# Patient Record
Sex: FEMALE | Race: WHITE | NOT HISPANIC OR LATINO | Employment: UNEMPLOYED | URBAN - METROPOLITAN AREA
[De-identification: names, ages, dates, MRNs, and addresses within clinical notes are randomized per-mention and may not be internally consistent; named-entity substitution may affect disease eponyms.]

---

## 2022-04-19 ENCOUNTER — OFFICE VISIT (OUTPATIENT)
Dept: OBSTETRICS AND GYNECOLOGY | Facility: CLINIC | Age: 37
End: 2022-04-19

## 2022-04-19 VITALS
DIASTOLIC BLOOD PRESSURE: 70 MMHG | BODY MASS INDEX: 33.9 KG/M2 | SYSTOLIC BLOOD PRESSURE: 130 MMHG | WEIGHT: 198.6 LBS | HEIGHT: 64 IN

## 2022-04-19 DIAGNOSIS — N99.85 POST ENDOMETRIAL ABLATION SYNDROME: ICD-10-CM

## 2022-04-19 DIAGNOSIS — N93.9 ABNORMAL UTERINE BLEEDING (AUB): Primary | ICD-10-CM

## 2022-04-19 PROCEDURE — 99203 OFFICE O/P NEW LOW 30 MIN: CPT | Performed by: OBSTETRICS & GYNECOLOGY

## 2022-04-19 RX ORDER — ATORVASTATIN CALCIUM 10 MG/1
10 TABLET, FILM COATED ORAL NIGHTLY
COMMUNITY
Start: 2022-04-08

## 2022-04-19 RX ORDER — OMEPRAZOLE 20 MG/1
20 CAPSULE, DELAYED RELEASE ORAL EVERY EVENING
COMMUNITY
Start: 2022-03-29

## 2022-04-19 RX ORDER — POLYETHYLENE GLYCOL 3350 17 G/17G
17 POWDER, FOR SOLUTION ORAL AS NEEDED
COMMUNITY
Start: 2022-03-29

## 2022-04-19 NOTE — PROGRESS NOTES
CC: Abnormal uterine bleeding  Patient is seen for an initial visit.  She is  6 para 6 with a prior endometrial ablation and tubal sterilization procedure in 2017.  She states that the procedure worked for a short period of time and then she started having periods again and they become much more painful and very heavy lasting 4 to 5 days at a time and just as heavy as they were before she opted for the procedure.  She does not do well with birth control pills and has a history of tobacco use although she does not use now.  We discussed the nature of failed endometrial ablations and in some cases trapped endometrial tissue and fluid that may cause her to have ongoing pain.  She did have a recent pelvic ultrasound that appeared normal on the reading that we were able to obtain.  Of note is that she was also found to have endometriosis in 2017 at the time of her tubal sterilization and endometrial ablation procedure.  Options of medical management versus definitive surgical management in the form of laparoscopic hysterectomy with bilateral salpingectomy were discussed with the patient.  She is not a good candidate for birth control pill use and does not want to try that.  She is also not a good candidate for progestin releasing IUD due to her prior ablation.  She also has had a negative experience with IUDs in the past.  Assessment: Abnormal uterine bleeding, endometriosis, post endometrial ablation syndrome  Plan: Written information and lengthy discussion was carried out with the patient regarding laparoscopic hysterectomy with bilateral salpingectomy.  The associated risks were all discussed.  She will discuss this with her family and call back when she wants to schedule.  I spent 30 minutes caring for Jyoti on this date of service. This time includes time spent by me in the following activities: preparing for the visit, reviewing tests, obtaining and/or reviewing a separately obtained history, counseling and  educating the patient/family/caregiver and documenting information in the medical record

## 2022-04-20 ENCOUNTER — TELEPHONE (OUTPATIENT)
Dept: OBSTETRICS AND GYNECOLOGY | Facility: CLINIC | Age: 37
End: 2022-04-20

## 2022-04-20 PROBLEM — N93.9 ABNORMAL UTERINE BLEEDING (AUB): Status: ACTIVE | Noted: 2022-04-20

## 2022-04-20 PROBLEM — N99.85 POST ENDOMETRIAL ABLATION SYNDROME: Status: ACTIVE | Noted: 2022-04-20

## 2022-04-20 NOTE — TELEPHONE ENCOUNTER
Pt called to schedule surgery. There is no case request in her chart.   Please advise  Thanks juany

## 2022-04-21 ENCOUNTER — PATIENT MESSAGE (OUTPATIENT)
Dept: OBSTETRICS AND GYNECOLOGY | Facility: CLINIC | Age: 37
End: 2022-04-21

## 2022-04-21 ENCOUNTER — PATIENT ROUNDING (BHMG ONLY) (OUTPATIENT)
Dept: OBSTETRICS AND GYNECOLOGY | Facility: CLINIC | Age: 37
End: 2022-04-21

## 2022-04-21 NOTE — PROGRESS NOTES
My chart message has been sent to the patient [place] [facility] [place]

## 2022-05-24 ENCOUNTER — TELEPHONE (OUTPATIENT)
Dept: OBSTETRICS AND GYNECOLOGY | Facility: CLINIC | Age: 37
End: 2022-05-24

## 2022-05-31 ENCOUNTER — PRE-ADMISSION TESTING (OUTPATIENT)
Dept: PREADMISSION TESTING | Facility: HOSPITAL | Age: 37
End: 2022-05-31

## 2022-05-31 VITALS
HEIGHT: 65 IN | OXYGEN SATURATION: 98 % | WEIGHT: 199 LBS | RESPIRATION RATE: 16 BRPM | TEMPERATURE: 99 F | SYSTOLIC BLOOD PRESSURE: 121 MMHG | DIASTOLIC BLOOD PRESSURE: 77 MMHG | HEART RATE: 80 BPM | BODY MASS INDEX: 33.15 KG/M2

## 2022-05-31 DIAGNOSIS — N93.9 ABNORMAL UTERINE BLEEDING (AUB): ICD-10-CM

## 2022-05-31 DIAGNOSIS — N99.85 POST ENDOMETRIAL ABLATION SYNDROME: ICD-10-CM

## 2022-05-31 LAB
HCG SERPL QL: NEGATIVE
SARS-COV-2 ORF1AB RESP QL NAA+PROBE: NOT DETECTED

## 2022-05-31 PROCEDURE — U0004 COV-19 TEST NON-CDC HGH THRU: HCPCS

## 2022-05-31 PROCEDURE — 85025 COMPLETE CBC W/AUTO DIFF WBC: CPT | Performed by: OBSTETRICS & GYNECOLOGY

## 2022-05-31 PROCEDURE — 84703 CHORIONIC GONADOTROPIN ASSAY: CPT

## 2022-05-31 PROCEDURE — C9803 HOPD COVID-19 SPEC COLLECT: HCPCS

## 2022-06-02 ENCOUNTER — ANESTHESIA EVENT (OUTPATIENT)
Dept: PERIOP | Facility: HOSPITAL | Age: 37
End: 2022-06-02

## 2022-06-02 ENCOUNTER — ANESTHESIA (OUTPATIENT)
Dept: PERIOP | Facility: HOSPITAL | Age: 37
End: 2022-06-02

## 2022-06-02 ENCOUNTER — HOSPITAL ENCOUNTER (OUTPATIENT)
Facility: HOSPITAL | Age: 37
Setting detail: HOSPITAL OUTPATIENT SURGERY
Discharge: HOME | End: 2022-06-02
Attending: OBSTETRICS & GYNECOLOGY | Admitting: OBSTETRICS & GYNECOLOGY

## 2022-06-02 VITALS
DIASTOLIC BLOOD PRESSURE: 79 MMHG | TEMPERATURE: 98.3 F | HEART RATE: 94 BPM | OXYGEN SATURATION: 95 % | RESPIRATION RATE: 16 BRPM | SYSTOLIC BLOOD PRESSURE: 111 MMHG

## 2022-06-02 DIAGNOSIS — N93.9 ABNORMAL UTERINE BLEEDING (AUB): ICD-10-CM

## 2022-06-02 DIAGNOSIS — N99.85 POST ENDOMETRIAL ABLATION SYNDROME: ICD-10-CM

## 2022-06-02 DIAGNOSIS — Z90.710 S/P LAPAROSCOPIC HYSTERECTOMY: Primary | ICD-10-CM

## 2022-06-02 PROCEDURE — 25010000002 PROPOFOL 10 MG/ML EMULSION: Performed by: NURSE ANESTHETIST, CERTIFIED REGISTERED

## 2022-06-02 PROCEDURE — 58571 TLH W/T/O 250 G OR LESS: CPT | Performed by: OBSTETRICS & GYNECOLOGY

## 2022-06-02 PROCEDURE — 25010000002 ONDANSETRON PER 1 MG: Performed by: NURSE ANESTHETIST, CERTIFIED REGISTERED

## 2022-06-02 PROCEDURE — 25010000002 CEFAZOLIN PER 500 MG: Performed by: OBSTETRICS & GYNECOLOGY

## 2022-06-02 PROCEDURE — 88307 TISSUE EXAM BY PATHOLOGIST: CPT | Performed by: OBSTETRICS & GYNECOLOGY

## 2022-06-02 PROCEDURE — 25010000002 MIDAZOLAM PER 1 MG: Performed by: ANESTHESIOLOGY

## 2022-06-02 PROCEDURE — 25010000002 FENTANYL CITRATE (PF) 50 MCG/ML SOLUTION: Performed by: NURSE ANESTHETIST, CERTIFIED REGISTERED

## 2022-06-02 PROCEDURE — 25010000002 KETOROLAC TROMETHAMINE PER 15 MG: Performed by: NURSE ANESTHETIST, CERTIFIED REGISTERED

## 2022-06-02 PROCEDURE — 25010000002 HYDROMORPHONE PER 4 MG: Performed by: NURSE ANESTHETIST, CERTIFIED REGISTERED

## 2022-06-02 PROCEDURE — 25010000002 DEXAMETHASONE PER 1 MG: Performed by: NURSE ANESTHETIST, CERTIFIED REGISTERED

## 2022-06-02 PROCEDURE — 25010000002 NEOSTIGMINE 5 MG/10ML SOLUTION: Performed by: NURSE ANESTHETIST, CERTIFIED REGISTERED

## 2022-06-02 DEVICE — ABSORBABLE HEMOSTAT (OXIDIZED REGENERATED CELLULOSE, U.S.P.)
Type: IMPLANTABLE DEVICE | Site: ABDOMEN | Status: FUNCTIONAL
Brand: SURGICEL

## 2022-06-02 DEVICE — ABSORBABLE WOUND CLOSURE DEVICE
Type: IMPLANTABLE DEVICE | Site: UTERUS | Status: FUNCTIONAL
Brand: V-LOC 90

## 2022-06-02 RX ORDER — FENTANYL CITRATE 50 UG/ML
50 INJECTION, SOLUTION INTRAMUSCULAR; INTRAVENOUS
Status: DISCONTINUED | OUTPATIENT
Start: 2022-06-02 | End: 2022-06-02 | Stop reason: HOSPADM

## 2022-06-02 RX ORDER — DIPHENHYDRAMINE HYDROCHLORIDE 50 MG/ML
12.5 INJECTION INTRAMUSCULAR; INTRAVENOUS
Status: DISCONTINUED | OUTPATIENT
Start: 2022-06-02 | End: 2022-06-02 | Stop reason: HOSPADM

## 2022-06-02 RX ORDER — FLUMAZENIL 0.1 MG/ML
0.2 INJECTION INTRAVENOUS AS NEEDED
Status: DISCONTINUED | OUTPATIENT
Start: 2022-06-02 | End: 2022-06-02 | Stop reason: HOSPADM

## 2022-06-02 RX ORDER — DIPHENHYDRAMINE HCL 25 MG
25 CAPSULE ORAL
Status: DISCONTINUED | OUTPATIENT
Start: 2022-06-02 | End: 2022-06-02 | Stop reason: HOSPADM

## 2022-06-02 RX ORDER — CEFAZOLIN SODIUM 2 G/100ML
2 INJECTION, SOLUTION INTRAVENOUS ONCE
Status: COMPLETED | OUTPATIENT
Start: 2022-06-02 | End: 2022-06-02

## 2022-06-02 RX ORDER — SCOLOPAMINE TRANSDERMAL SYSTEM 1 MG/1
1 PATCH, EXTENDED RELEASE TRANSDERMAL ONCE
Status: DISCONTINUED | OUTPATIENT
Start: 2022-06-02 | End: 2022-06-02 | Stop reason: HOSPADM

## 2022-06-02 RX ORDER — NALOXONE HCL 0.4 MG/ML
0.2 VIAL (ML) INJECTION AS NEEDED
Status: DISCONTINUED | OUTPATIENT
Start: 2022-06-02 | End: 2022-06-02 | Stop reason: HOSPADM

## 2022-06-02 RX ORDER — SODIUM CHLORIDE 0.9 % (FLUSH) 0.9 %
3-10 SYRINGE (ML) INJECTION AS NEEDED
Status: DISCONTINUED | OUTPATIENT
Start: 2022-06-02 | End: 2022-06-02 | Stop reason: HOSPADM

## 2022-06-02 RX ORDER — DEXAMETHASONE SODIUM PHOSPHATE 10 MG/ML
INJECTION INTRAMUSCULAR; INTRAVENOUS AS NEEDED
Status: DISCONTINUED | OUTPATIENT
Start: 2022-06-02 | End: 2022-06-02 | Stop reason: SURG

## 2022-06-02 RX ORDER — IBUPROFEN 600 MG/1
600 TABLET ORAL ONCE AS NEEDED
Status: DISCONTINUED | OUTPATIENT
Start: 2022-06-02 | End: 2022-06-02 | Stop reason: HOSPADM

## 2022-06-02 RX ORDER — ONDANSETRON 2 MG/ML
4 INJECTION INTRAMUSCULAR; INTRAVENOUS ONCE AS NEEDED
Status: DISCONTINUED | OUTPATIENT
Start: 2022-06-02 | End: 2022-06-02 | Stop reason: HOSPADM

## 2022-06-02 RX ORDER — SODIUM CHLORIDE 0.9 % (FLUSH) 0.9 %
3 SYRINGE (ML) INJECTION EVERY 12 HOURS SCHEDULED
Status: DISCONTINUED | OUTPATIENT
Start: 2022-06-02 | End: 2022-06-02 | Stop reason: HOSPADM

## 2022-06-02 RX ORDER — HYDROMORPHONE HCL 110MG/55ML
PATIENT CONTROLLED ANALGESIA SYRINGE INTRAVENOUS AS NEEDED
Status: DISCONTINUED | OUTPATIENT
Start: 2022-06-02 | End: 2022-06-02 | Stop reason: SURG

## 2022-06-02 RX ORDER — FAMOTIDINE 10 MG/ML
20 INJECTION, SOLUTION INTRAVENOUS ONCE
Status: COMPLETED | OUTPATIENT
Start: 2022-06-02 | End: 2022-06-02

## 2022-06-02 RX ORDER — BUPIVACAINE HYDROCHLORIDE 5 MG/ML
INJECTION, SOLUTION EPIDURAL; INTRACAUDAL AS NEEDED
Status: DISCONTINUED | OUTPATIENT
Start: 2022-06-02 | End: 2022-06-02 | Stop reason: HOSPADM

## 2022-06-02 RX ORDER — NEOSTIGMINE METHYLSULFATE 0.5 MG/ML
INJECTION, SOLUTION INTRAVENOUS AS NEEDED
Status: DISCONTINUED | OUTPATIENT
Start: 2022-06-02 | End: 2022-06-02 | Stop reason: SURG

## 2022-06-02 RX ORDER — ROCURONIUM BROMIDE 10 MG/ML
INJECTION, SOLUTION INTRAVENOUS AS NEEDED
Status: DISCONTINUED | OUTPATIENT
Start: 2022-06-02 | End: 2022-06-02 | Stop reason: SURG

## 2022-06-02 RX ORDER — MAGNESIUM HYDROXIDE 1200 MG/15ML
LIQUID ORAL AS NEEDED
Status: DISCONTINUED | OUTPATIENT
Start: 2022-06-02 | End: 2022-06-02 | Stop reason: HOSPADM

## 2022-06-02 RX ORDER — OXYCODONE AND ACETAMINOPHEN 7.5; 325 MG/1; MG/1
1 TABLET ORAL EVERY 4 HOURS PRN
Status: DISCONTINUED | OUTPATIENT
Start: 2022-06-02 | End: 2022-06-02 | Stop reason: HOSPADM

## 2022-06-02 RX ORDER — LIDOCAINE HYDROCHLORIDE 10 MG/ML
0.5 INJECTION, SOLUTION EPIDURAL; INFILTRATION; INTRACAUDAL; PERINEURAL ONCE AS NEEDED
Status: DISCONTINUED | OUTPATIENT
Start: 2022-06-02 | End: 2022-06-02 | Stop reason: HOSPADM

## 2022-06-02 RX ORDER — LABETALOL HYDROCHLORIDE 5 MG/ML
5 INJECTION, SOLUTION INTRAVENOUS
Status: DISCONTINUED | OUTPATIENT
Start: 2022-06-02 | End: 2022-06-02 | Stop reason: HOSPADM

## 2022-06-02 RX ORDER — FENTANYL CITRATE 50 UG/ML
INJECTION, SOLUTION INTRAMUSCULAR; INTRAVENOUS AS NEEDED
Status: DISCONTINUED | OUTPATIENT
Start: 2022-06-02 | End: 2022-06-02 | Stop reason: SURG

## 2022-06-02 RX ORDER — EPHEDRINE SULFATE 50 MG/ML
5 INJECTION, SOLUTION INTRAVENOUS ONCE AS NEEDED
Status: DISCONTINUED | OUTPATIENT
Start: 2022-06-02 | End: 2022-06-02 | Stop reason: HOSPADM

## 2022-06-02 RX ORDER — PROMETHAZINE HYDROCHLORIDE 25 MG/1
25 SUPPOSITORY RECTAL ONCE AS NEEDED
Status: DISCONTINUED | OUTPATIENT
Start: 2022-06-02 | End: 2022-06-02 | Stop reason: HOSPADM

## 2022-06-02 RX ORDER — SODIUM CHLORIDE, SODIUM LACTATE, POTASSIUM CHLORIDE, CALCIUM CHLORIDE 600; 310; 30; 20 MG/100ML; MG/100ML; MG/100ML; MG/100ML
9 INJECTION, SOLUTION INTRAVENOUS CONTINUOUS
Status: DISCONTINUED | OUTPATIENT
Start: 2022-06-02 | End: 2022-06-02 | Stop reason: HOSPADM

## 2022-06-02 RX ORDER — KETOROLAC TROMETHAMINE 30 MG/ML
INJECTION, SOLUTION INTRAMUSCULAR; INTRAVENOUS AS NEEDED
Status: DISCONTINUED | OUTPATIENT
Start: 2022-06-02 | End: 2022-06-02 | Stop reason: SURG

## 2022-06-02 RX ORDER — GLYCOPYRROLATE 0.2 MG/ML
INJECTION INTRAMUSCULAR; INTRAVENOUS AS NEEDED
Status: DISCONTINUED | OUTPATIENT
Start: 2022-06-02 | End: 2022-06-02 | Stop reason: SURG

## 2022-06-02 RX ORDER — PROMETHAZINE HYDROCHLORIDE 25 MG/1
25 TABLET ORAL ONCE AS NEEDED
Status: DISCONTINUED | OUTPATIENT
Start: 2022-06-02 | End: 2022-06-02 | Stop reason: HOSPADM

## 2022-06-02 RX ORDER — ONDANSETRON 2 MG/ML
INJECTION INTRAMUSCULAR; INTRAVENOUS AS NEEDED
Status: DISCONTINUED | OUTPATIENT
Start: 2022-06-02 | End: 2022-06-02 | Stop reason: SURG

## 2022-06-02 RX ORDER — SODIUM CHLORIDE 9 MG/ML
INJECTION, SOLUTION INTRAVENOUS AS NEEDED
Status: DISCONTINUED | OUTPATIENT
Start: 2022-06-02 | End: 2022-06-02 | Stop reason: HOSPADM

## 2022-06-02 RX ORDER — MIDAZOLAM HYDROCHLORIDE 1 MG/ML
1 INJECTION INTRAMUSCULAR; INTRAVENOUS
Status: COMPLETED | OUTPATIENT
Start: 2022-06-02 | End: 2022-06-02

## 2022-06-02 RX ORDER — HYDROCODONE BITARTRATE AND ACETAMINOPHEN 5; 325 MG/1; MG/1
1 TABLET ORAL EVERY 4 HOURS PRN
Qty: 24 TABLET | Refills: 0 | Status: SHIPPED | OUTPATIENT
Start: 2022-06-02 | End: 2022-07-22

## 2022-06-02 RX ORDER — HYDROMORPHONE HYDROCHLORIDE 1 MG/ML
0.5 INJECTION, SOLUTION INTRAMUSCULAR; INTRAVENOUS; SUBCUTANEOUS
Status: DISCONTINUED | OUTPATIENT
Start: 2022-06-02 | End: 2022-06-02 | Stop reason: HOSPADM

## 2022-06-02 RX ORDER — LIDOCAINE HYDROCHLORIDE 20 MG/ML
INJECTION, SOLUTION INFILTRATION; PERINEURAL AS NEEDED
Status: DISCONTINUED | OUTPATIENT
Start: 2022-06-02 | End: 2022-06-02 | Stop reason: SURG

## 2022-06-02 RX ORDER — PROPOFOL 10 MG/ML
VIAL (ML) INTRAVENOUS AS NEEDED
Status: DISCONTINUED | OUTPATIENT
Start: 2022-06-02 | End: 2022-06-02 | Stop reason: SURG

## 2022-06-02 RX ORDER — HYDRALAZINE HYDROCHLORIDE 20 MG/ML
5 INJECTION INTRAMUSCULAR; INTRAVENOUS
Status: DISCONTINUED | OUTPATIENT
Start: 2022-06-02 | End: 2022-06-02 | Stop reason: HOSPADM

## 2022-06-02 RX ORDER — ONDANSETRON 4 MG/1
4 TABLET, FILM COATED ORAL DAILY PRN
Qty: 30 TABLET | Refills: 1 | Status: SHIPPED | OUTPATIENT
Start: 2022-06-02 | End: 2022-07-22

## 2022-06-02 RX ORDER — HYDROCODONE BITARTRATE AND ACETAMINOPHEN 7.5; 325 MG/1; MG/1
1 TABLET ORAL ONCE AS NEEDED
Status: COMPLETED | OUTPATIENT
Start: 2022-06-02 | End: 2022-06-02

## 2022-06-02 RX ADMIN — PROPOFOL 25 MCG/KG/MIN: 10 INJECTION, EMULSION INTRAVENOUS at 12:38

## 2022-06-02 RX ADMIN — ROCURONIUM BROMIDE 40 MG: 50 INJECTION INTRAVENOUS at 12:33

## 2022-06-02 RX ADMIN — LIDOCAINE HYDROCHLORIDE 60 MG: 20 INJECTION, SOLUTION INFILTRATION; PERINEURAL at 12:33

## 2022-06-02 RX ADMIN — CEFAZOLIN 2 G: 10 INJECTION, POWDER, FOR SOLUTION INTRAVENOUS at 12:23

## 2022-06-02 RX ADMIN — MIDAZOLAM HYDROCHLORIDE 1 MG: 2 INJECTION, SOLUTION INTRAMUSCULAR; INTRAVENOUS at 10:51

## 2022-06-02 RX ADMIN — SCOPALAMINE 1 PATCH: 1 PATCH, EXTENDED RELEASE TRANSDERMAL at 10:44

## 2022-06-02 RX ADMIN — SODIUM CHLORIDE, POTASSIUM CHLORIDE, SODIUM LACTATE AND CALCIUM CHLORIDE 9 ML/HR: 600; 310; 30; 20 INJECTION, SOLUTION INTRAVENOUS at 10:43

## 2022-06-02 RX ADMIN — FENTANYL CITRATE 50 MCG: 50 INJECTION INTRAMUSCULAR; INTRAVENOUS at 13:07

## 2022-06-02 RX ADMIN — KETOROLAC TROMETHAMINE 30 MG: 30 INJECTION, SOLUTION INTRAMUSCULAR at 13:57

## 2022-06-02 RX ADMIN — HYDROCODONE BITARTRATE AND ACETAMINOPHEN 1 TABLET: 7.5; 325 TABLET ORAL at 15:01

## 2022-06-02 RX ADMIN — NEOSTIGMINE METHYLSULFATE 3 MG: 0.5 INJECTION INTRAVENOUS at 14:04

## 2022-06-02 RX ADMIN — FENTANYL CITRATE 50 MCG: 50 INJECTION INTRAMUSCULAR; INTRAVENOUS at 15:12

## 2022-06-02 RX ADMIN — ONDANSETRON 4 MG: 2 INJECTION INTRAMUSCULAR; INTRAVENOUS at 13:57

## 2022-06-02 RX ADMIN — GLYCOPYRROLATE 0.2 MG: 0.2 INJECTION INTRAMUSCULAR; INTRAVENOUS at 12:54

## 2022-06-02 RX ADMIN — GLYCOPYRROLATE 0.4 MG: 0.2 INJECTION INTRAMUSCULAR; INTRAVENOUS at 14:04

## 2022-06-02 RX ADMIN — MIDAZOLAM HYDROCHLORIDE 1 MG: 2 INJECTION, SOLUTION INTRAMUSCULAR; INTRAVENOUS at 11:47

## 2022-06-02 RX ADMIN — ROCURONIUM BROMIDE 10 MG: 50 INJECTION INTRAVENOUS at 13:34

## 2022-06-02 RX ADMIN — HYDROMORPHONE HYDROCHLORIDE 0.5 MG: 2 INJECTION, SOLUTION INTRAMUSCULAR; INTRAVENOUS; SUBCUTANEOUS at 13:17

## 2022-06-02 RX ADMIN — FAMOTIDINE 20 MG: 10 INJECTION, SOLUTION INTRAVENOUS at 10:44

## 2022-06-02 RX ADMIN — DEXAMETHASONE SODIUM PHOSPHATE 8 MG: 10 INJECTION INTRAMUSCULAR; INTRAVENOUS at 13:15

## 2022-06-02 RX ADMIN — FENTANYL CITRATE 50 MCG: 50 INJECTION INTRAMUSCULAR; INTRAVENOUS at 12:34

## 2022-06-02 RX ADMIN — PROPOFOL 200 MG: 10 INJECTION, EMULSION INTRAVENOUS at 12:33

## 2022-06-02 NOTE — ANESTHESIA PROCEDURE NOTES
Airway  Urgency: elective    Date/Time: 6/2/2022 12:37 PM  Airway not difficult    General Information and Staff    Patient location during procedure: OR  Anesthesiologist: Radha Hodgson MD  CRNA/CAA: Jeannie Lewis CRNA    Indications and Patient Condition  Indications for airway management: airway protection    Preoxygenated: yes  MILS not maintained throughout  Mask difficulty assessment: 1 - vent by mask    Final Airway Details  Final airway type: endotracheal airway      Successful airway: ETT  Cuffed: yes   Successful intubation technique: direct laryngoscopy  Endotracheal tube insertion site: oral  Blade: Lory  Blade size: 3  ETT size (mm): 7.0  Cormack-Lehane Classification: grade I - full view of glottis  Placement verified by: chest auscultation and capnometry   Measured from: lips  ETT/EBT  to lips (cm): 22  Number of attempts at approach: 1  Assessment: lips, teeth, and gum same as pre-op and atraumatic intubation    Additional Comments  PreO2 100%, FEO2 >85, SIVI, easy BMV, sniff position, ETT placed/ confirmed, attraumatic, teeth and lips as preop.

## 2022-06-02 NOTE — ANESTHESIA PREPROCEDURE EVALUATION
Anesthesia Evaluation     Patient summary reviewed and Nursing notes reviewed   history of anesthetic complications: PONV  NPO Solid Status: > 8 hours  NPO Liquid Status: > 2 hours           Airway   Mallampati: II  TM distance: >3 FB  Neck ROM: full  No difficulty expected  Dental - normal exam     Pulmonary - normal exam   (+) a smoker Former,   Cardiovascular - normal exam    (+) hyperlipidemia,       Neuro/Psych  GI/Hepatic/Renal/Endo    (+)  GERD well controlled,      Musculoskeletal     Abdominal    Substance History      OB/GYN          Other                      Anesthesia Plan    ASA 2     general   (PONV prophylaxis    I have reviewed the patient's history and chart with the patient, including all pertinent laboratory results and imaging. I have explained the risks of anesthesia including but not limited to dental damage, corneal abrasion, nerve injury, MI, stroke, aspiration, and death. Patient has agreed to proceed.     /90   Pulse 73   Temp 36.9 °C (98.5 °F) (Oral)   LMP 05/20/2022 Comment: tubal ligation 2017  SpO2 98%   )  intravenous induction     Anesthetic plan, all risks, benefits, and alternatives have been provided, discussed and informed consent has been obtained with: patient.        CODE STATUS:

## 2022-06-02 NOTE — ANESTHESIA POSTPROCEDURE EVALUATION
Patient: Jyoti Petty    Procedure Summary     Date: 06/02/22 Room / Location:  [facility]  [facility]    Anesthesia Start: 1228 Anesthesia Stop: 1421    Procedure: TOTAL LAPAROSCOPIC HYSTERECTOMY BILATERAL SALPINGECTOMY (Bilateral Abdomen) Diagnosis:       Abnormal uterine bleeding (AUB)      Post endometrial ablation syndrome      (Abnormal uterine bleeding (AUB) [N93.9])      (Post endometrial ablation syndrome [N99.85])    Surgeons: Pranav Sewell MD Provider: Cristofer Mortensen MD    Anesthesia Type: general ASA Status: 2          Anesthesia Type: general    Vitals  Vitals Value Taken Time   /56 06/02/22 1531   Temp 36.8 °C (98.3 °F) 06/02/22 1530   Pulse 63 06/02/22 1535   Resp 16 06/02/22 1530   SpO2 94 % 06/02/22 1535   Vitals shown include unvalidated device data.        Post Anesthesia Care and Evaluation    Patient location during evaluation: bedside  Patient participation: complete - patient participated  Level of consciousness: awake and alert  Pain management: adequate  Airway patency: patent  Anesthetic complications: No anesthetic complications    Cardiovascular status: acceptable  Respiratory status: acceptable  Hydration status: acceptable    Comments: /56   Pulse 68   Temp 36.8 °C (98.3 °F) (Temporal)   Resp 16   LMP 05/20/2022 Comment: tubal ligation 2017  SpO2 93%

## 2022-06-02 NOTE — OP NOTE
Subjective     Date of Service:  06/02/22  Time of Service:  14:26 EDT    Surgical Staff: Surgeon(s) and Role:     * Pranav Sewell MD - Primary     * Scar Najera MD - Assisting   Additional Staff:  Dr. Najera was needed for adequate camera maintenance and operating the laparoscope as well as providing exposure and retraction for adequate hemostasis and suture closure.   Pre-operative diagnosis(es): Pre-Op Diagnosis Codes:     * Abnormal uterine bleeding (AUB) [N93.9]     * Post endometrial ablation syndrome [N99.85]     Post-operative diagnosis(es): Post-Op Diagnosis Codes:     * Abnormal uterine bleeding (AUB) [N93.9]     * Post endometrial ablation syndrome [N99.85]   Procedure(s): Procedure(s):  TOTAL LAPAROSCOPIC HYSTERECTOMY BILATERAL SALPINGECTOMY     Antibiotics: cefazolin (Ancef) ordered on call to OR     Anesthesia: Type: General  ASA:  II     Objective      Operative findings:  Evidence of prior tubal sterilization.  Slightly boggy uterus upon opening consistent with possible adenomyosis.  Normal-appearing ovaries bilaterally.   Specimens removed: ID Type Source Tests Collected by Time   A : Uterus, Cervix and bilateral Fallopian tubes Tissue Uterus, Cervix, Bilateral Fallopian Tubes  TISSUE PATHOLOGY EXAM Pranav Sewell MD 6/2/2022 1338      Fluid Intake: .mL   Output: Documented Output  Est. Blood Loss 250 mL  Urine Output 400 mL  Other Output .mL  NG/OG Output .mL    I/O this shift:  In: -   Out: 650 [Urine:400; Blood:250]     Blood products used: No   Drains: [REMOVED] Urethral Catheter Silicone 16 Fr. (Removed)      Implant Information: Implant Name Type Inv. Item Serial No.  Lot No. LRB No. Used Action   DEV CLS WND VLOC/90 LOYDA ABS 1/2CIR SZ2/0 27MM 23CM LULY - [id number] Implant DEV CLS WND VLOC/90 LOYDA ABS 1/2CIR SZ2/0 27MM 23CM [facility] [id number]  1 Implanted   HEMOST ABS SURGICEL 2X14 - [id number] Implant HEMOST ABS SURGICEL 2X14  [employer]  [id number] N/A 1 Implanted      Complications:  None   Intraoperative procedure:  Patient was taken the operating room adequate anesthetic administered.  She is in the low lithotomy position in the [facility].  She is prepped and draped in usual manner Tomas catheter was placed.  A 4 cm Koh uterine manipulator was placed.  We went above and infiltrated with ultimately a total of 30 cc of half percent Marcaine plain and 4 incision sites.  Small infraumbilical incision is made and a 5 mm Optiview port is placed.  Pneumoperitoneum was achieved and an 8 mm ports placed in left lower quadrant and another 5 mm port placed in the right lower quadrant.  We elevated the uterus and put her in Trendelenburg position.  We isolated the left fallopian tube and elevated the fimbriated end and sealed and then cut through the mesosalpinx ultimately removing the tube and passing through the 8 mm port site.  And then came through the utero-ovarian ligament sealing and then cutting and then dissecting anteriorly along the anterior leaf of the broad ligament after sealing and then dividing the round ligament on the left.  I was able to identify easily the outline of the colpotomy cup and we dissected the anterior peritoneum off of that we dissected somewhat posteriorly and then took the uterine vessels.  We came in at 90 degrees and sealed and then cut and continue dissection until the vessel pedicle was allowed to retract lateral to the colpotomy cup.  All of the sealing and use of sealing device was done within the circumference of the colpotomy cup.  I then turned our attention to the right side we remove the right fallopian tube in the same manner came through the right utero-ovarian ligament and then the right round ligament.  We switched sides with the sealing device and dissected anteriorly along the broad ligament creating a bladder flap and joining it with our dissection we created from the left.  We sealed serially and then  cut the uterine vessel pedicle and then released it inferiorly over the colpotomy cup edge.  We had adequate retraction of tissue with a bladder flap anteriorly and we then switched out to a L-hook and a monopolar cut mode coming down onto the colpotomy cup identifying it and then cutting circumferentially around following the colpotomy cup until the specimen was excised.  We went below and pulled the specimen through the vagina and then placed to bulb filled with 60 cc of air back into the vagina for maintenance of pneumoperitoneum.  We irrigated we had some continued oozing from the cuff and and there was oozing there was backbleeding premeds the entire case we were able to irrigate and get a clear field and identified the right uterosacral ligament I then used a 2 oh Mono Derm Quill suture V-Loc stitch and placed it through the posterior vaginal cuff incorporating the vaginal epithelium and then anteriorly and then began running the suture in a running nonlocking manner to the left uterosacral ligament.  A back stitch was used and then this suture was then cut and removed through the 8 mm port site.  Irrigation was carried out and some small cautery was used for small bleeders and then Surgicel was placed across the vaginal cuff and anterior peritoneum.  Inspection of the ovarian pedicles showed good hemostasis.  We allowed CO2 gas to expel 4-0 Vicryl was used to close skin at each of the skin sites.  Went below and placed a sponge stick and there was no active bleeding vaginally.  Urine was clear and Tomas was removed.   Condition: stable   Disposition: to PACU and then admit to  Home         Assessment & Plan     Abnormal uterine bleeding with post tubal ablation syndrome.  Status post laparoscopic hysterectomy with bilateral salpingectomy.              Pranav Sewell MD  06/02/22  14:26 EDT

## 2022-06-02 NOTE — H&P
Patient Care Team:  David Tracey MD as PCP - General (Family Medicine)    Chief complaint heavy menstrual bleeding    Subjective     Patient is a 36 y.o. female presents with a history of being  6 para 6 with a prior tubal sterilization and endometrial ablation done in 2017.  She states the procedure worked well initially but not long after that she started having heavier periods that lasted 4 to 5 days and very heavy with clots.  She tried birth control pills and these did not help.  She is also noted increasing pain with each menses that began when she started to have periods again after the ablation.  She has not done well with intrauterine device in the past.  It was also noted that she had some endometriosis at the time of her tubal sterilization procedure.  Definitive management of form of total laparoscopic hysterectomy with bilateral salpingectomy is to be undertaken.  Risks of bleeding, infection, injury to adjacent organs such as bowel, bladder, ureters were also discussed.    Review of Systems   Pertinent items are noted in HPI, all other systems reviewed and negative    History  Past Medical History:   Diagnosis Date   • Abnormal Pap smear of cervix     First noticed abnormal pap but have had treatments and had a clear pap as of    • Cervical dysplasia     Had conesation to remove the bad part of cervix   • Chlamydia    • Endometriosis     Found during ablation procedure   • GERD (gastroesophageal reflux disease)    • Heavy periods    • HPV (human papilloma virus) infection    • Hyperlipidemia Elevaeted and given medicaiton 2022   • PONV (postoperative nausea and vomiting)    • Varicella      Past Surgical History:   Procedure Laterality Date   • CERVICAL BIOPSY  W/ LOOP ELECTRODE EXCISION     • ENDOMETRIAL ABLATION  2017   • LAPAROSCOPIC CHOLECYSTECTOMY     • TONSILLECTOMY     • TUBAL ABDOMINAL LIGATION       Family History   Problem Relation  Age of Onset   • Diabetes Mother    • Malig Hyperthermia Neg Hx      Social History     Tobacco Use   • Smoking status: Former Smoker     Packs/day: 1.50     Years: 10.00     Pack years: 15.00     Types: Cigarettes     Start date: 11/1/2004     Quit date: 9/27/2018     Years since quitting: 3.6   • Smokeless tobacco: Never Used   Vaping Use   • Vaping Use: Never used   Substance Use Topics   • Alcohol use: Not Currently   • Drug use: Never     E-cigarette/Vaping   • E-cigarette/Vaping Use Never User      E-cigarette/Vaping Substances     Medications Prior to Admission   Medication Sig Dispense Refill Last Dose   • atorvastatin (LIPITOR) 10 MG tablet Take 10 mg by mouth Every Night.   6/1/2022 at Unknown time   • omeprazole (priLOSEC) 20 MG capsule Take 20 mg by mouth Every Evening.   6/1/2022 at Unknown time   • polyethylene glycol (MIRALAX) 17 GM/SCOOP powder Take 17 g by mouth As Needed.   More than a month at Unknown time     Allergies:  Patient has no known allergies.    Objective     Vital Signs  Temp:  [98.5 °F (36.9 °C)] 98.5 °F (36.9 °C)  Heart Rate:  [73] 73  BP: (121)/(90) 121/90    Physical Exam:      General Appearance:    Alert, cooperative, in no acute distress   Head:    Normocephalic, without obvious abnormality, atraumatic   Eyes:            Lids and lashes normal, conjunctivae and sclerae normal, no   icterus, no pallor, corneas clear, PERRLA   Ears:    Ears appear intact with no abnormalities noted   Throat:   No oral lesions, no thrush, oral mucosa moist   Neck:   No adenopathy, supple, trachea midline, no thyromegaly, no     carotid bruit, no JVD   Back:     No kyphosis present, no scoliosis present, no skin lesions,       erythema or scars, no tenderness to percussion or                   palpation,   range of motion normal   Lungs:     Clear to auscultation,respirations regular, even and                   unlabored    Heart:    Regular rhythm and normal rate, normal S1 and S2, no             murmur, no gallop, no rub, no click   Breast Exam:    Deferred   Abdomen:     Normal bowel sounds, no masses, no organomegaly, soft        non-tender, non-distended, no guarding, no rebound                 tenderness   Genitalia:    Deferred   Extremities:   Moves all extremities well, no edema, no cyanosis, no              redness   Pulses:   Pulses palpable and equal bilaterally   Skin:   No bleeding, bruising or rash   Lymph nodes:   No palpable adenopathy   Neurologic:   Cranial nerves 2 - 12 grossly intact, sensation intact, DTR        present and equal bilaterally       Results Review:    I reviewed the patient's new clinical results.    Assessment & Plan       Abnormal uterine bleeding (AUB)    Post endometrial ablation syndrome    Plan: Total laparoscopic hysterectomy with bilateral salpingectomy.    I discussed the patients findings and my recommendations with patient.     Pranav Sewell MD  06/02/22  12:27 EDT    Time: .

## 2022-06-02 NOTE — DISCHARGE INSTRUCTIONS
Routine laparoscopic hysterectomy instructions.  Nothing in the vagina for 8 weeks.  Light activity for the first several days and increasing thereafter.  No driving for 1 week.  Appoint with me in 2 weeks.

## 2022-06-03 LAB
LAB AP CASE REPORT: NORMAL
PATH REPORT.FINAL DX SPEC: NORMAL
PATH REPORT.GROSS SPEC: NORMAL

## 2022-06-17 ENCOUNTER — OFFICE VISIT (OUTPATIENT)
Dept: OBSTETRICS AND GYNECOLOGY | Facility: CLINIC | Age: 37
End: 2022-06-17

## 2022-06-17 VITALS
WEIGHT: 199.8 LBS | HEIGHT: 64 IN | BODY MASS INDEX: 34.11 KG/M2 | SYSTOLIC BLOOD PRESSURE: 110 MMHG | DIASTOLIC BLOOD PRESSURE: 64 MMHG

## 2022-06-17 DIAGNOSIS — Z09 POSTOPERATIVE FOLLOW-UP: Primary | ICD-10-CM

## 2022-06-17 PROCEDURE — 99024 POSTOP FOLLOW-UP VISIT: CPT | Performed by: OBSTETRICS & GYNECOLOGY

## 2022-06-17 NOTE — PROGRESS NOTES
"Subjective     Jyoti Petty is a 36 y.o. female who presents to the clinic 2 weeks status post Laparoscopic hysterectomy with salpingectomy for abnormal uterine bleeding and pelvic pain. Eating a regular diet without difficulty. Bowel movements are normal. The patient is not having any pain.   Minimal blood-tinged spotting periodically    The following portions of the patient's history were reviewed and updated as appropriate: allergies, current medications, past family history, past medical history, past social history, past surgical history and problem list.    Review of Systems  Constitutional: negative for fevers  Gastrointestinal: negative for abdominal pain, diarrhea and vomiting  Genitourinary:negative for dysuria, frequency and hematuria      Objective     /64   Ht 162.6 cm (64\")   Wt 90.6 kg (199 lb 12.8 oz)   LMP 05/20/2022   BMI 34.30 kg/m²   General:  alert, appears stated age and cooperative   Abdomen: soft, bowel sounds active, non-tender   Incision:   healing well, no drainage, no erythema, no hernia, no seroma, no swelling, no dehiscence, incision well approximated         Assessment      Doing well postoperatively.  Operative findings again reviewed. Pathology report discussed.      Plan     1. Continue any current medications.  2. Wound care discussed.  3. Activity restrictions: Pelvic rest for another 6 weeks.  May otherwise resume activities as she tolerates  4. Anticipated return to work: now.  5. Follow up: 4 weeks for postop and vaginal cuff assessment..   Answers for HPI/ROS submitted by the patient on 6/15/2022  Please describe your symptoms.: Sore and tired which I am assuming is normal following this  procedure  Have you had these symptoms before?: No  How long have you been having these symptoms?: 1-2 weeks  Please list any medications you are currently taking for this condition.: Ibuprofen and Tylenol  Please describe any probable cause for these symptoms. : Some soreness " after surgery  What is the primary reason for your visit?: Other       HPI  6/17/2022

## 2022-07-22 ENCOUNTER — OFFICE VISIT (OUTPATIENT)
Dept: OBSTETRICS AND GYNECOLOGY | Facility: CLINIC | Age: 37
End: 2022-07-22

## 2022-07-22 VITALS
SYSTOLIC BLOOD PRESSURE: 130 MMHG | BODY MASS INDEX: 33.7 KG/M2 | DIASTOLIC BLOOD PRESSURE: 70 MMHG | WEIGHT: 197.4 LBS | HEIGHT: 64 IN

## 2022-07-22 DIAGNOSIS — N39.3 STRESS INCONTINENCE OF URINE: ICD-10-CM

## 2022-07-22 DIAGNOSIS — Z09 POSTOPERATIVE FOLLOW-UP: Primary | ICD-10-CM

## 2022-07-22 LAB
BILIRUB BLD-MCNC: NEGATIVE MG/DL
CLARITY, POC: CLEAR
COLOR UR: YELLOW
GLUCOSE UR STRIP-MCNC: NEGATIVE MG/DL
KETONES UR QL: NEGATIVE
LEUKOCYTE EST, POC: NEGATIVE
NITRITE UR-MCNC: NEGATIVE MG/ML
PH UR: 5.5 [PH] (ref 5–8)
PROT UR STRIP-MCNC: NEGATIVE MG/DL
RBC # UR STRIP: ABNORMAL /UL
SP GR UR: 1.02 (ref 1–1.03)
UROBILINOGEN UR QL: NORMAL

## 2022-07-22 PROCEDURE — 99024 POSTOP FOLLOW-UP VISIT: CPT | Performed by: OBSTETRICS & GYNECOLOGY

## 2022-07-22 PROCEDURE — 81002 URINALYSIS NONAUTO W/O SCOPE: CPT | Performed by: OBSTETRICS & GYNECOLOGY

## 2022-07-22 NOTE — PROGRESS NOTES
"Answers for HPI/ROS submitted by the patient on 7/20/2022  Please describe your symptoms.: Post surgical check  Have you had these symptoms before?: No  How long have you been having these symptoms?: Greater than 2 weeks  What is the primary reason for your visit?: Other    Subjective     Jyoti Petty is a 36 y.o. female who presents to the clinic 6 weeks status post Total laparoscopic hysterectomy with bilateral salpingectomy for abnormal uterine bleeding. Eating a regular diet without difficulty. Bowel movements are normal. The patient is not having any pain.     She does note some occasional stress urinary incontinence when she feels like she is emptied her bladder and then would cough or sneeze.    The following portions of the patient's history were reviewed and updated as appropriate: allergies, current medications, past family history, past medical history, past social history, past surgical history and problem list.    Review of Systems  A comprehensive review of systems was negative.      Objective     /70   Ht 162.6 cm (64\")   Wt 89.5 kg (197 lb 6.4 oz)   LMP 05/20/2022   BMI 33.88 kg/m²   General:  alert, appears stated age and cooperative   Abdomen: soft, bowel sounds active, non-tender   Incision:   healing well, no drainage, no erythema, no hernia, no seroma, no swelling, no dehiscence, incision well approximated     Vaginal cuff: Well-healed with no evidence of separation or granulation tissue.    Assessment   Voiding dysfunction following hysterectomy.  We will check urine culture.  We will try to give another 3 to 4 weeks and I encouraged to complete back bladder emptying techniques.  If continues will send to urogynecology.   Doing well postoperatively.  Operative findings again reviewed. Pathology report discussed.      Plan     1. Continue any current medications.  2. Wound care discussed.  3. Activity restrictions: none.  No vaginal intercourse for least 2 more weeks.  4. Anticipated " return to work: now.  5. Follow up: 1 Year for annual visit for suture removal.

## 2022-07-24 LAB
BACTERIA UR CULT: NORMAL
BACTERIA UR CULT: NORMAL

## (undated) DEVICE — DISPOSABLE MONOPOLAR ENDOSCOPIC CORD 10 FT. (3M): Brand: KIRWAN

## (undated) DEVICE — VISUALIZATION SYSTEM: Brand: CLEARIFY

## (undated) DEVICE — SYRINGE, LUER LOCK, 60ML: Brand: MEDLINE

## (undated) DEVICE — SOL NACL 0.9PCT 1000ML

## (undated) DEVICE — ENDOPATH XCEL BLADELESS TROCARS WITH STABILITY SLEEVES: Brand: ENDOPATH XCEL

## (undated) DEVICE — 3M™ STERI-STRIP™ COMPOUND BENZOIN TINCTURE 40 BAGS/CARTON 4 CARTONS/CASE C1544: Brand: 3M™ STERI-STRIP™

## (undated) DEVICE — GLV SURG SIGNATURE ESSENTIAL PF LTX SZ7.5

## (undated) DEVICE — GOWN,SLEEVE,STERILE,W/CSR WRAP,1/P: Brand: MEDLINE

## (undated) DEVICE — PATIENT RETURN ELECTRODE, SINGLE-USE, CONTACT QUALITY MONITORING, ADULT, WITH 9FT CORD, FOR PATIENTS WEIGING OVER 33LBS. (15KG): Brand: MEGADYNE

## (undated) DEVICE — COVER,MAYO STAND,STERILE: Brand: MEDLINE

## (undated) DEVICE — ENDOPATH XCEL UNIVERSAL TROCAR STABLILITY SLEEVES: Brand: ENDOPATH XCEL

## (undated) DEVICE — 100% SILICONE FOLEY TRAY,16 FR/CH (5.3 MM), 5 ML CATHETER PRE-CONNECTED TO 2000 ML DRAINAGE BAG WITH LUER-LOCK SAMPLING AND ADHESIVE CATHETER SECUREMENT: Brand: DOVER

## (undated) DEVICE — ENSEAL X1 TISSUE SEALER, CURVED JAW, 37 CM SHAFT LENGTH: Brand: ENSEAL

## (undated) DEVICE — MANIP UTER ADVINCULA DELINEATOR W/ 4CM ULTEM PLSTC CUP

## (undated) DEVICE — ENDOCUT SCISSOR TIP, DISPOSABLE: Brand: RENEW

## (undated) DEVICE — PK LAP GYN TOWER 40

## (undated) DEVICE — GLV SURG SIGNATURE ESSENTIAL PF LTX SZ8

## (undated) DEVICE — SYR LL TP 10ML STRL

## (undated) DEVICE — 2, DISPOSABLE SUCTION/IRRIGATOR WITH DISPOSABLE TIP: Brand: STRYKEFLOW

## (undated) DEVICE — ANTIBACTERIAL UNDYED BRAIDED (POLYGLACTIN 910), SYNTHETIC ABSORBABLE SUTURE: Brand: COATED VICRYL

## (undated) DEVICE — GOWN,SIRUS,NON REINFRCD,LARGE,SET IN SL: Brand: MEDLINE

## (undated) DEVICE — 40585 XL ADVANCED TRENDELENBURG POSITIONING KIT: Brand: 40585 XL ADVANCED TRENDELENBURG POSITIONING KIT

## (undated) DEVICE — 3M™ STERI-STRIP™ REINFORCED ADHESIVE SKIN CLOSURES, R1546, 1/4 IN X 4 IN (6 MM X 100 MM), 10 STRIPS/ENVELOPE: Brand: 3M™ STERI-STRIP™